# Patient Record
Sex: FEMALE | URBAN - METROPOLITAN AREA
[De-identification: names, ages, dates, MRNs, and addresses within clinical notes are randomized per-mention and may not be internally consistent; named-entity substitution may affect disease eponyms.]

---

## 2022-07-13 ENCOUNTER — APPOINTMENT (RX ONLY)
Dept: URBAN - METROPOLITAN AREA CLINIC 43 | Facility: CLINIC | Age: 47
Setting detail: DERMATOLOGY
End: 2022-07-13

## 2022-07-13 DIAGNOSIS — L40.0 PSORIASIS VULGARIS: ICD-10-CM

## 2022-07-13 PROCEDURE — ? TREATMENT REGIMEN

## 2022-07-13 PROCEDURE — ? COUNSELING

## 2022-07-13 PROCEDURE — ? SKYRIZI INITIATION

## 2022-07-13 PROCEDURE — ? ORDER TESTS

## 2022-07-13 PROCEDURE — 99204 OFFICE O/P NEW MOD 45 MIN: CPT

## 2022-07-13 PROCEDURE — ? PRESCRIPTION

## 2022-07-13 RX ORDER — FLUOCINOLONE ACETONIDE 0.11 MG/ML
OIL TOPICAL
Qty: 118.28 | Refills: 5 | Status: ERX | COMMUNITY
Start: 2022-07-13

## 2022-07-13 RX ADMIN — FLUOCINOLONE ACETONIDE: 0.11 OIL TOPICAL at 00:00

## 2022-07-13 ASSESSMENT — LOCATION SIMPLE DESCRIPTION DERM
LOCATION SIMPLE: LEFT ELBOW
LOCATION SIMPLE: LEFT FOREHEAD
LOCATION SIMPLE: ABDOMEN
LOCATION SIMPLE: RIGHT POSTERIOR UPPER ARM
LOCATION SIMPLE: RIGHT FOREARM

## 2022-07-13 ASSESSMENT — LOCATION DETAILED DESCRIPTION DERM
LOCATION DETAILED: RIGHT PROXIMAL DORSAL FOREARM
LOCATION DETAILED: RIGHT DISTAL POSTERIOR UPPER ARM
LOCATION DETAILED: LEFT ELBOW
LOCATION DETAILED: PERIUMBILICAL SKIN
LOCATION DETAILED: LEFT FOREHEAD

## 2022-07-13 ASSESSMENT — LOCATION ZONE DERM
LOCATION ZONE: FACE
LOCATION ZONE: ARM
LOCATION ZONE: TRUNK

## 2022-07-13 ASSESSMENT — BSA PSORIASIS: % BODY COVERED IN PSORIASIS: 40

## 2022-07-13 ASSESSMENT — PGA PSORIASIS: PGA PSORIASIS 2020: SEVERE

## 2022-07-13 NOTE — PROCEDURE: SKYRIZI INITIATION
Is Phototherapy Contraindicated?: No
Skyrizi Monitoring Guidelines: A yearly test for tuberculosis is required while taking Skyrizi.
Diagnosis (Required): Psoriasis
Detail Level: Zone
Is Methotrexate Contraindicated?: Yes
Skyrizi Dosing: 150 mg SC week 0 and week 4 then every 12 weeks thereafter
Pregnancy And Lactation Warning Text: The risk during pregnancy and breastfeeding is uncertain with this medication.

## 2022-07-13 NOTE — PROCEDURE: COUNSELING
Moisturizers Recommendations: VaniCreme hand cream
Detail Level: Zone
Topical Keratolytics Recommendations: 40% urea cream, AmLactin or Lac Hydrin cream 1-2 times daily.  Ok to use T/Sal shampoo on the scalp.
Coal Tar Recommendations: Neutrogena T/gel 2-3 times weekly.

## 2022-07-13 NOTE — PROCEDURE: TREATMENT REGIMEN
Detail Level: Zone
Action 3: Continue
Continue Regimen: Clobetasol lotion PRN flares on the scalp.
Initiate Regimen: Dermasmoothe in oil once daily.

## 2022-07-19 ENCOUNTER — RX ONLY (OUTPATIENT)
Age: 47
Setting detail: RX ONLY
End: 2022-07-19

## 2022-07-19 RX ORDER — RISANKIZUMAB-RZAA 150 MG/ML
INJECTION SUBCUTANEOUS
Qty: 1 | Refills: 4 | Status: ERX | COMMUNITY
Start: 2022-07-19

## 2022-07-19 RX ORDER — CLOBETASOL PROPIONATE 0.5 MG/G
OINTMENT TOPICAL
Qty: 60 | Refills: 0 | Status: ERX | COMMUNITY
Start: 2022-07-19
